# Patient Record
Sex: FEMALE | Race: OTHER | HISPANIC OR LATINO | Employment: FULL TIME | URBAN - METROPOLITAN AREA
[De-identification: names, ages, dates, MRNs, and addresses within clinical notes are randomized per-mention and may not be internally consistent; named-entity substitution may affect disease eponyms.]

---

## 2017-06-04 ENCOUNTER — HOSPITAL ENCOUNTER (EMERGENCY)
Facility: HOSPITAL | Age: 27
Discharge: HOME | End: 2017-06-04
Payer: COMMERCIAL

## 2017-06-04 VITALS
DIASTOLIC BLOOD PRESSURE: 61 MMHG | TEMPERATURE: 97.2 F | SYSTOLIC BLOOD PRESSURE: 135 MMHG | RESPIRATION RATE: 18 BRPM | OXYGEN SATURATION: 99 % | HEART RATE: 87 BPM | WEIGHT: 195 LBS

## 2017-06-04 DIAGNOSIS — L25.3: Primary | ICD-10-CM

## 2017-06-04 PROCEDURE — 99282 EMERGENCY DEPT VISIT SF MDM: CPT

## 2017-06-04 RX ORDER — TRIAMCINOLONE ACETONIDE 0.25 MG/G
1 CREAM TOPICAL 2 TIMES DAILY
Qty: 30 G | Refills: 0 | Status: SHIPPED | OUTPATIENT
Start: 2017-06-04 | End: 2019-02-13

## 2018-10-20 ENCOUNTER — APPOINTMENT (OUTPATIENT)
Dept: URGENT CARE | Facility: MEDICAL CENTER | Age: 28
End: 2018-10-20
Payer: OTHER MISCELLANEOUS

## 2018-10-20 PROCEDURE — G0382 LEV 3 HOSP TYPE B ED VISIT: HCPCS

## 2018-10-20 PROCEDURE — 99283 EMERGENCY DEPT VISIT LOW MDM: CPT

## 2018-10-23 ENCOUNTER — APPOINTMENT (OUTPATIENT)
Dept: URGENT CARE | Facility: MEDICAL CENTER | Age: 28
End: 2018-10-23

## 2018-10-30 ENCOUNTER — APPOINTMENT (OUTPATIENT)
Dept: URGENT CARE | Facility: MEDICAL CENTER | Age: 28
End: 2018-10-30
Payer: OTHER MISCELLANEOUS

## 2018-10-30 PROCEDURE — 99213 OFFICE O/P EST LOW 20 MIN: CPT

## 2019-02-13 ENCOUNTER — HOSPITAL ENCOUNTER (EMERGENCY)
Facility: HOSPITAL | Age: 29
Discharge: HOME | End: 2019-02-13
Attending: EMERGENCY MEDICINE
Payer: COMMERCIAL

## 2019-02-13 VITALS
TEMPERATURE: 98.3 F | OXYGEN SATURATION: 98 % | RESPIRATION RATE: 18 BRPM | SYSTOLIC BLOOD PRESSURE: 129 MMHG | HEART RATE: 88 BPM | WEIGHT: 194.3 LBS | DIASTOLIC BLOOD PRESSURE: 61 MMHG

## 2019-02-13 DIAGNOSIS — M54.30 SCIATICA: Primary | ICD-10-CM

## 2019-02-13 PROCEDURE — 96372 THER/PROPH/DIAG INJ SC/IM: CPT

## 2019-02-13 PROCEDURE — 99283 EMERGENCY DEPT VISIT LOW MDM: CPT

## 2019-02-13 RX ORDER — BACLOFEN 10 MG/1
10 TABLET ORAL 3 TIMES DAILY
Qty: 12 TABLET | Refills: 0 | Status: SHIPPED | OUTPATIENT
Start: 2019-02-13 | End: 2019-06-26

## 2019-02-13 RX ORDER — KETOROLAC TROMETHAMINE 30 MG/ML
15 INJECTION, SOLUTION INTRAMUSCULAR; INTRAVENOUS ONCE
Status: COMPLETED | OUTPATIENT
Start: 2019-02-13 | End: 2019-02-13

## 2019-02-13 RX ORDER — METHYLPREDNISOLONE 4 MG/1
TABLET ORAL
Qty: 21 TABLET | Refills: 0 | Status: SHIPPED | OUTPATIENT
Start: 2019-02-13 | End: 2019-06-26

## 2019-02-13 RX ADMIN — KETOROLAC TROMETHAMINE 15 MG: 30 INJECTION, SOLUTION INTRAMUSCULAR at 12:35

## 2019-02-13 NOTE — ED PROVIDER NOTES
History  Chief Complaint   Patient presents with    Leg Pain     patient reports left hip pain which radiates down leg  Denies injury  Began two hours ago  Tylenol taken without relief  This is a 68-year-old female patient who states she was walking at work approximately 2 hours ago and got pain into her left lower back/buttock and around her thigh with some numbness and paresthesia that does not go past the knee no change in bowel or bladder no saddle anesthesia she walked in under own power  Is made worse when she walks when she stands or when she moves made better when she lies flat on her back  No IV drug use no fever no chills  No headache blurred vision double vision no cough congestion sore throat no nausea vomiting diarrhea abdominal pain no chest pain or shortness of breath no urinary symptoms  She denies any trauma  Does have a history of sciatica  None       History reviewed  No pertinent past medical history  Past Surgical History:   Procedure Laterality Date     SECTION      TONSILLECTOMY         History reviewed  No pertinent family history  I have reviewed and agree with the history as documented  Social History     Tobacco Use    Smoking status: Current Every Day Smoker    Smokeless tobacco: Never Used   Substance Use Topics    Alcohol use: No    Drug use: No        Review of Systems   All other systems reviewed and are negative  Physical Exam  Physical Exam   Constitutional: She appears well-developed and well-nourished  HENT:   Head: Normocephalic and atraumatic  Right Ear: External ear normal    Left Ear: External ear normal    Nose: Nose normal    Mouth/Throat: Oropharynx is clear and moist    Eyes: Pupils are equal, round, and reactive to light  Conjunctivae are normal    Neck: Normal range of motion  Neck supple  Cardiovascular: Normal rate and regular rhythm  Pulmonary/Chest: Effort normal and breath sounds normal    Abdominal: Soft   Bowel sounds are normal  There is no tenderness  Musculoskeletal: Normal range of motion  Back:    Neurological: She is alert  Skin: Skin is warm  Psychiatric: She has a normal mood and affect  Her behavior is normal    Nursing note and vitals reviewed  Vital Signs  ED Triage Vitals [02/13/19 1137]   Temperature Pulse Respirations Blood Pressure SpO2   98 3 °F (36 8 °C) 88 18 129/61 98 %      Temp Source Heart Rate Source Patient Position - Orthostatic VS BP Location FiO2 (%)   Temporal Monitor Sitting Right arm --      Pain Score       7           Vitals:    02/13/19 1137   BP: 129/61   Pulse: 88   Patient Position - Orthostatic VS: Sitting       Visual Acuity      ED Medications  Medications   ketorolac (TORADOL) injection 15 mg (15 mg Intramuscular Given 2/13/19 1235)       Diagnostic Studies  Results Reviewed     None                 No orders to display              Procedures  Procedures       Phone Contacts  ED Phone Contact    ED Course  ED Course as of Feb 13 1306   Wed Feb 13, 2019   1304 Medical decision making:  Patient came in with sciatic type symptoms was given Toradol now has little to no discomfort would like to go home stable for discharge                                  MDM    Disposition  Final diagnoses:   Sciatica     Time reflects when diagnosis was documented in both MDM as applicable and the Disposition within this note     Time User Action Codes Description Comment    2/13/2019  1:04 PM [facility] [place], [place], [facility] [M54 30] Sciatica       ED Disposition     ED Disposition Condition Date/Time Comment    Discharge Stable Wed Feb 13, 2019  1:05 PM Lauren Perdomo discharge to home/self care              Follow-up Information     Follow up With Specialties Details Why [address] Family Medicine Call today  [address] [place] [address] [address] [address] [phone]            Patient's Medications   Discharge Prescriptions    BACLOFEN 10 MG TABLET    Take 1 tablet (10 mg total) by mouth 3 (three) times a day for 4 days       Start Date: 2/13/2019 End Date: 2/17/2019       Order Dose: 10 mg       Quantity: 12 tablet    Refills: 0    METHYLPREDNISOLONE 4 MG TABLET THERAPY PACK    Use as directed on package       Start Date: 2/13/2019 End Date: --       Order Dose: --       Quantity: 21 tablet    Refills: 0     No discharge procedures on file      ED Provider  Electronically Signed by           Leona Sanchez PA-C  02/13/19 4827

## 2019-06-26 ENCOUNTER — HOSPITAL ENCOUNTER (EMERGENCY)
Facility: HOSPITAL | Age: 29
Discharge: HOME | End: 2019-06-26
Attending: EMERGENCY MEDICINE | Admitting: EMERGENCY MEDICINE
Payer: COMMERCIAL

## 2019-06-26 VITALS
OXYGEN SATURATION: 98 % | RESPIRATION RATE: 16 BRPM | SYSTOLIC BLOOD PRESSURE: 134 MMHG | DIASTOLIC BLOOD PRESSURE: 83 MMHG | HEART RATE: 89 BPM | WEIGHT: 196.43 LBS | TEMPERATURE: 98.4 F

## 2019-06-26 DIAGNOSIS — M54.50 LOW BACK PAIN: Primary | ICD-10-CM

## 2019-06-26 PROCEDURE — 96372 THER/PROPH/DIAG INJ SC/IM: CPT

## 2019-06-26 PROCEDURE — 99283 EMERGENCY DEPT VISIT LOW MDM: CPT

## 2019-06-26 PROCEDURE — 99284 EMERGENCY DEPT VISIT MOD MDM: CPT | Performed by: PHYSICIAN ASSISTANT

## 2019-06-26 RX ORDER — KETOROLAC TROMETHAMINE 30 MG/ML
15 INJECTION, SOLUTION INTRAMUSCULAR; INTRAVENOUS ONCE
Status: COMPLETED | OUTPATIENT
Start: 2019-06-26 | End: 2019-06-26

## 2019-06-26 RX ORDER — METHOCARBAMOL 750 MG/1
750 TABLET, FILM COATED ORAL EVERY 6 HOURS PRN
Qty: 20 TABLET | Refills: 0 | Status: SHIPPED | OUTPATIENT
Start: 2019-06-26 | End: 2019-08-19

## 2019-06-26 RX ORDER — ACETAMINOPHEN 325 MG/1
650 TABLET ORAL EVERY 6 HOURS PRN
COMMUNITY
End: 2019-08-19

## 2019-06-26 RX ORDER — NAPROXEN 500 MG/1
500 TABLET ORAL 2 TIMES DAILY WITH MEALS
Qty: 30 TABLET | Refills: 0 | Status: SHIPPED | OUTPATIENT
Start: 2019-06-26 | End: 2019-08-19

## 2019-06-26 RX ADMIN — KETOROLAC TROMETHAMINE 15 MG: 30 INJECTION, SOLUTION INTRAMUSCULAR; INTRAVENOUS at 14:10

## 2019-06-26 NOTE — DISCHARGE INSTRUCTIONS
DISCHARGE INSTRUCTIONS:    FOLLOW UP WITH YOUR PRIMARY CARE PROVIDER OR THE [address] [place] [address]  MAKE AN APPOINTMENT TO BE SEEN  TAKE NAPROXEN AS PRESCRIBED FOR PAIN AND INFLAMMATION  IF RASH, SHORTNESS OF BREATH OR TROUBLE SWALLOWING, STOP TAKING THE MEDICATION AND BE SEEN  TAKE ROBAXIN AS PRESCRIBED FOR MUSCLE STRAIN  IF RASH, SHORTNESS OF BREATH OR TROUBLE SWALLOWING, STOP TAKING THE MEDICATION AND BE SEEN  REST AND APPLY HEAT TO THE AREA  IF SYMPTOMS WORSEN OR NEW SYMPTOMS ARISE, RETURN TO THE ER TO BE SEEN

## 2019-06-26 NOTE — ED PROVIDER NOTES
History  Chief Complaint   Patient presents with    Back Pain     pt c/o left lower back pain with radiation to left leg  pt reports hx of sciatica  reporting similar symptoms  denies radiatioin of pain to abd      29y  o female with PMH of HTN in pregnancy presents to the ER for left hip pain for 3 weeks  Patient has been taking Motrin for pain  She describes her pain as sharp and burning and radiating down her leg  She rates her pain 6/10 and states it comes and goes  She has a history of sciatica and states this feels like her typical pain  Patient states she normally gets a shot and a muscle relaxer and symptoms improve  She denies fever, chills, chest pain, dyspnea, N/V/D, abdominal pain, urinary symptoms, bowel/bladder incontinence, weakness or paresthesias  History provided by:  Patient   used: No        Prior to Admission Medications   Prescriptions Last Dose Informant Patient Reported? Taking?   acetaminophen (TYLENOL) 325 mg tablet   Yes Yes   Sig: Take 650 mg by mouth every 6 (six) hours as needed for mild pain      Facility-Administered Medications: None       Past Medical History:   Diagnosis Date    HTN in pregnancy, chronic        Past Surgical History:   Procedure Laterality Date     SECTION      TONSILLECTOMY         History reviewed  No pertinent family history  I have reviewed and agree with the history as documented  Social History     Tobacco Use    Smoking status: Current Every Day Smoker    Smokeless tobacco: Never Used   Substance Use Topics    Alcohol use: No    Drug use: No        Review of Systems   Constitutional: Negative for chills and fever  Eyes: Negative for photophobia and visual disturbance  Respiratory: Negative for shortness of breath  Cardiovascular: Negative for chest pain  Gastrointestinal: Negative for abdominal pain, diarrhea, nausea and vomiting  Genitourinary: Negative for dysuria, frequency, hematuria and urgency  Musculoskeletal: Positive for back pain  Negative for neck pain and neck stiffness  Skin: Negative for rash  Allergic/Immunologic: Negative for food allergies  Neurological: Negative for weakness, numbness and headaches  Physical Exam  Physical Exam   Constitutional:  Non-toxic appearance  No distress  HENT:   Head: Normocephalic and atraumatic  Right Ear: Tympanic membrane, external ear and ear canal normal  No drainage, swelling or tenderness  No foreign bodies  Tympanic membrane is not erythematous  No hemotympanum  Left Ear: Tympanic membrane, external ear and ear canal normal  No drainage, swelling or tenderness  No foreign bodies  Tympanic membrane is not erythematous  No hemotympanum  Nose: Nose normal    Mouth/Throat: Uvula is midline, oropharynx is clear and moist and mucous membranes are normal  No uvula swelling  No posterior oropharyngeal edema, posterior oropharyngeal erythema or tonsillar abscesses  No tonsillar exudate  Eyes: Pupils are equal, round, and reactive to light  Conjunctivae and EOM are normal    Neck: Normal range of motion and phonation normal  Neck supple  No tracheal deviation present  Cardiovascular: Normal rate, regular rhythm, S1 normal, S2 normal and normal heart sounds  Exam reveals no gallop and no friction rub  No murmur heard  Pulmonary/Chest: Effort normal and breath sounds normal  No respiratory distress  She has no decreased breath sounds  She has no wheezes  She has no rhonchi  She has no rales  She exhibits no tenderness  Abdominal: Soft  Bowel sounds are normal  She exhibits no distension  There is no tenderness  There is no rebound and no guarding  Musculoskeletal: Normal range of motion  She exhibits no edema or deformity  Cervical back: Normal         Thoracic back: Normal         Lumbar back: She exhibits tenderness and pain  She exhibits normal range of motion, no bony tenderness, no swelling, no edema and no deformity  Neurological: She is alert  She has normal strength  No cranial nerve deficit or sensory deficit  She exhibits normal muscle tone  Gait normal  GCS eye subscore is 4  GCS verbal subscore is 5  GCS motor subscore is 6  Skin: Skin is warm and dry  No rash noted  Psychiatric: She has a normal mood and affect  Nursing note and vitals reviewed  Vital Signs  ED Triage Vitals   Temperature Pulse Respirations Blood Pressure SpO2   06/26/19 1310 06/26/19 1320 06/26/19 1320 06/26/19 1320 06/26/19 1320   98 4 °F (36 9 °C) 89 16 134/83 98 %      Temp Source Heart Rate Source Patient Position - Orthostatic VS BP Location FiO2 (%)   06/26/19 1310 -- -- -- --   Temporal          Pain Score       06/26/19 1320       7           Vitals:    06/26/19 1320   BP: 134/83   Pulse: 89         Visual Acuity      ED Medications  Medications   ketorolac (TORADOL) injection 15 mg (15 mg Intramuscular Given 6/26/19 1410)       Diagnostic Studies  Results Reviewed     None                 No orders to display              Procedures  Procedures       ED Course                               MDM  Number of Diagnoses or Management Options  Low back pain: new and does not require workup  Diagnosis management comments: DDX consists of but not limited to: fracture, contusion, strain, sciatica    Will give Toradol  At discharge, I instructed the patient to:  -follow up with pcp  -take Naproxen as prescribed for pain and inflammation  -take Robaxin as prescribed  -rest and apply heat  -return to the ER if symptoms worsened or new symptoms arose  Patient agreed to this plan and was stable at time of discharge      Patient Progress  Patient progress: stable      Disposition  Final diagnoses:   Low back pain     Time reflects when diagnosis was documented in both MDM as applicable and the Disposition within this note     Time User Action Codes Description Comment    6/26/2019  2:05 PM Sharita AMATO Add [M54 5] Low back pain       ED Disposition     ED Disposition Condition Date/Time Comment    Discharge Stable Wed Jun 26, 2019  2:05 PM Lee Jonas discharge to home/self care  Follow-up Information     Follow up With Specialties Details Why Contact Info Additional [address] Family Medicine Schedule an appointment as soon as possible for a visit  As needed [address], [address] [address]  [address], [address], [address] [place], [place], [address]          Discharge Medication List as of 6/26/2019  2:06 PM      START taking these medications    Details   methocarbamol (ROBAXIN) 750 mg tablet Take 1 tablet (750 mg total) by mouth every 6 (six) hours as needed for muscle spasms, Starting Wed 6/26/2019, Print      naproxen (NAPROSYN) 500 mg tablet Take 1 tablet (500 mg total) by mouth 2 (two) times a day with meals, Starting Wed 6/26/2019, Print         CONTINUE these medications which have NOT CHANGED    Details   acetaminophen (TYLENOL) 325 mg tablet Take 650 mg by mouth every 6 (six) hours as needed for mild pain, Historical Med           No discharge procedures on file      ED Provider  Electronically Signed by           Jiame Krishna PA-C  06/26/19 8474

## 2019-08-19 ENCOUNTER — HOSPITAL ENCOUNTER (EMERGENCY)
Facility: HOSPITAL | Age: 29
Discharge: HOME | End: 2019-08-19
Attending: EMERGENCY MEDICINE
Payer: COMMERCIAL

## 2019-08-19 VITALS
OXYGEN SATURATION: 100 % | RESPIRATION RATE: 18 BRPM | HEART RATE: 67 BPM | TEMPERATURE: 99.3 F | WEIGHT: 185.41 LBS | DIASTOLIC BLOOD PRESSURE: 64 MMHG | SYSTOLIC BLOOD PRESSURE: 117 MMHG

## 2019-08-19 DIAGNOSIS — Z32.01 POSITIVE PREGNANCY TEST: ICD-10-CM

## 2019-08-19 DIAGNOSIS — R19.7 DIARRHEA: ICD-10-CM

## 2019-08-19 DIAGNOSIS — R42 LIGHTHEADEDNESS: Primary | ICD-10-CM

## 2019-08-19 DIAGNOSIS — R11.0 NAUSEA: ICD-10-CM

## 2019-08-19 LAB
ANION GAP SERPL CALCULATED.3IONS-SCNC: 9 MMOL/L (ref 4–13)
B-HCG SERPL-ACNC: 183.9 MIU/ML
BILIRUB UR QL STRIP: NEGATIVE
BUN SERPL-MCNC: 16 MG/DL (ref 5–25)
CALCIUM SERPL-MCNC: 8.6 MG/DL (ref 8.3–10.1)
CHLORIDE SERPL-SCNC: 105 MMOL/L (ref 100–108)
CLARITY UR: CLEAR
CLARITY, POC: CLEAR
CO2 SERPL-SCNC: 22 MMOL/L (ref 21–32)
COLOR UR: YELLOW
COLOR, POC: YELLOW
CREAT SERPL-MCNC: 0.53 MG/DL (ref 0.6–1.3)
EXT PREG TEST URINE: ABNORMAL
EXT. CONTROL ED NAV: ABNORMAL
GFR SERPL CREATININE-BSD FRML MDRD: 127 ML/MIN/1.73SQ M
GLUCOSE SERPL-MCNC: 94 MG/DL (ref 65–140)
GLUCOSE UR STRIP-MCNC: NEGATIVE MG/DL
HGB UR QL STRIP.AUTO: NEGATIVE
KETONES UR STRIP-MCNC: NEGATIVE MG/DL
LEUKOCYTE ESTERASE UR QL STRIP: NEGATIVE
MAGNESIUM SERPL-MCNC: 1.8 MG/DL (ref 1.6–2.6)
NITRITE UR QL STRIP: NEGATIVE
PH UR STRIP.AUTO: 5.5 [PH] (ref 4.5–8)
POTASSIUM SERPL-SCNC: 3.9 MMOL/L (ref 3.5–5.3)
PROT UR STRIP-MCNC: NEGATIVE MG/DL
SODIUM SERPL-SCNC: 136 MMOL/L (ref 136–145)
SP GR UR STRIP.AUTO: 1.02 (ref 1–1.03)
UROBILINOGEN UR QL STRIP.AUTO: 0.2 E.U./DL

## 2019-08-19 PROCEDURE — 80048 BASIC METABOLIC PNL TOTAL CA: CPT | Performed by: EMERGENCY MEDICINE

## 2019-08-19 PROCEDURE — 99284 EMERGENCY DEPT VISIT MOD MDM: CPT | Performed by: EMERGENCY MEDICINE

## 2019-08-19 PROCEDURE — 84702 CHORIONIC GONADOTROPIN TEST: CPT | Performed by: EMERGENCY MEDICINE

## 2019-08-19 PROCEDURE — 36415 COLL VENOUS BLD VENIPUNCTURE: CPT | Performed by: EMERGENCY MEDICINE

## 2019-08-19 PROCEDURE — 99284 EMERGENCY DEPT VISIT MOD MDM: CPT

## 2019-08-19 PROCEDURE — 83735 ASSAY OF MAGNESIUM: CPT | Performed by: EMERGENCY MEDICINE

## 2019-08-19 PROCEDURE — 81003 URINALYSIS AUTO W/O SCOPE: CPT

## 2019-08-19 PROCEDURE — 96361 HYDRATE IV INFUSION ADD-ON: CPT

## 2019-08-19 PROCEDURE — 81025 URINE PREGNANCY TEST: CPT | Performed by: EMERGENCY MEDICINE

## 2019-08-19 PROCEDURE — 96374 THER/PROPH/DIAG INJ IV PUSH: CPT

## 2019-08-19 RX ORDER — ACETAMINOPHEN 325 MG/1
650 TABLET ORAL ONCE
Status: COMPLETED | OUTPATIENT
Start: 2019-08-19 | End: 2019-08-19

## 2019-08-19 RX ORDER — METOCLOPRAMIDE HYDROCHLORIDE 5 MG/ML
10 INJECTION INTRAMUSCULAR; INTRAVENOUS ONCE
Status: COMPLETED | OUTPATIENT
Start: 2019-08-19 | End: 2019-08-19

## 2019-08-19 RX ORDER — KETOROLAC TROMETHAMINE 30 MG/ML
15 INJECTION, SOLUTION INTRAMUSCULAR; INTRAVENOUS ONCE
Status: DISCONTINUED | OUTPATIENT
Start: 2019-08-19 | End: 2019-08-19

## 2019-08-19 RX ADMIN — SODIUM CHLORIDE 1000 ML: 0.9 INJECTION, SOLUTION INTRAVENOUS at 15:17

## 2019-08-19 RX ADMIN — METOCLOPRAMIDE 10 MG: 5 INJECTION, SOLUTION INTRAMUSCULAR; INTRAVENOUS at 15:17

## 2019-08-19 RX ADMIN — ACETAMINOPHEN 650 MG: 325 TABLET ORAL at 15:17

## 2019-08-19 NOTE — ED PROVIDER NOTES
History  Chief Complaint   Patient presents with    Dizziness     this morning    Diarrhea     for 4 days with nausea      (1 miscarriage)  Took pregnancy test two times over the weekend which were positive  Having diarrhea x 4 days  Feels lightheaded  Also having low back pain and nausea  Denies abd pain and vaginal bleeding  LMP 19      History provided by:  Patient   used: No    Diarrhea   Quality:  Watery  Duration:  3 days  Progression:  Unchanged  Relieved by:  Nothing  Worsened by:  Nothing  Ineffective treatments:  None tried  Associated symptoms: chills    Associated symptoms: no abdominal pain, no fever and no vomiting    Risk factors: no sick contacts and no travel to endemic areas        None       Past Medical History:   Diagnosis Date    HTN in pregnancy, chronic        Past Surgical History:   Procedure Laterality Date     SECTION      TONSILLECTOMY         History reviewed  No pertinent family history  I have reviewed and agree with the history as documented  Social History     Tobacco Use    Smoking status: Current Every Day Smoker     Packs/day: 0 50    Smokeless tobacco: Never Used   Substance Use Topics    Alcohol use: Yes     Comment: socially    Drug use: No        Review of Systems   Constitutional: Positive for chills  Negative for appetite change and fever  HENT: Negative for congestion and rhinorrhea  Respiratory: Negative for cough  Gastrointestinal: Positive for diarrhea and nausea  Negative for abdominal distention, abdominal pain, blood in stool, constipation and vomiting  Genitourinary: Positive for frequency  Negative for dysuria, flank pain, hematuria, urgency, vaginal bleeding and vaginal discharge  Musculoskeletal: Negative for back pain  All other systems reviewed and are negative  Physical Exam  Physical Exam   Constitutional: She is oriented to person, place, and time   She appears well-developed and well-nourished  No distress  HENT:   Head: Normocephalic  Mouth/Throat: Oropharynx is clear and moist and mucous membranes are normal    Neck: Normal range of motion  Neck supple  Cardiovascular: Normal rate, regular rhythm, normal heart sounds and intact distal pulses  Exam reveals no gallop and no friction rub  No murmur heard  Pulmonary/Chest: Effort normal and breath sounds normal  No respiratory distress  She has no wheezes  She has no rales  Abdominal: Soft  Normal appearance and bowel sounds are normal  She exhibits no distension and no mass  There is no hepatosplenomegaly  There is no tenderness  There is no rigidity, no rebound, no guarding, no CVA tenderness, no tenderness at McBurney's point and negative Emerson's sign  Musculoskeletal:        Thoracic back: She exhibits no tenderness and no bony tenderness  Lumbar back: She exhibits no tenderness and no bony tenderness  Lymphadenopathy:     She has no cervical adenopathy  Neurological: She is alert and oriented to person, place, and time  Skin: Skin is warm, dry and intact  No rash noted  No pallor  Nursing note and vitals reviewed        Vital Signs  ED Triage Vitals [08/19/19 1233]   Temperature Pulse Respirations Blood Pressure SpO2   99 3 °F (37 4 °C) 100 18 129/57 99 %      Temp Source Heart Rate Source Patient Position - Orthostatic VS BP Location FiO2 (%)   Oral Monitor Sitting Right arm --      Pain Score       4           Vitals:    08/19/19 1233 08/19/19 1507   BP: 129/57 117/64   Pulse: 100 67   Patient Position - Orthostatic VS: Sitting Lying         Visual Acuity      ED Medications  Medications   sodium chloride 0 9 % bolus 1,000 mL (0 mL Intravenous Stopped 8/19/19 1621)   metoclopramide (REGLAN) injection 10 mg (10 mg Intravenous Given 8/19/19 1517)   acetaminophen (TYLENOL) tablet 650 mg (650 mg Oral Given 8/19/19 1517)       Diagnostic Studies  Results Reviewed     Procedure Component Value Units Date/Time Quantitative hCG [[id number]]  (Abnormal) Collected:  08/19/19 1446    Lab Status:  Final result Specimen:  Blood from Arm, Left Updated:  08/19/19 1517     HCG, Quant 183 9 mIU/mL     Narrative:        Expected Ranges:     Approximate               Approximate HCG  Gestation age          Concentration ( mIU/mL)  _____________          ______________________   Jenet Reyes                      HCG values  0 2-1                       5-50  1-2                           2-3                         100-5000  3-4                         500-10200  4-5                         1000-55257  5-6                         [id number]  6-8                         [id number]  8-12                        [id number]      Basic metabolic panel [[id number]]  (Abnormal) Collected:  08/19/19 1446    Lab Status:  Final result Specimen:  Blood from Arm, Left Updated:  08/19/19 1503     Sodium 136 mmol/L      Potassium 3 9 mmol/L      Chloride 105 mmol/L      CO2 22 mmol/L      ANION GAP 9 mmol/L      BUN 16 mg/dL      Creatinine 0 53 mg/dL      Glucose 94 mg/dL      Calcium 8 6 mg/dL      eGFR 127 ml/min/1 73sq m     Narrative:       [place] guidelines for Chronic Kidney Disease (CKD):     Stage 1 with normal or high GFR (GFR > 90 mL/min/1 73 square meters)    Stage 2 Mild CKD (GFR = 60-89 mL/min/1 73 square meters)    Stage 3A Moderate CKD (GFR = 45-59 mL/min/1 73 square meters)    Stage 3B Moderate CKD (GFR = 30-44 mL/min/1 73 square meters)    Stage 4 Severe CKD (GFR = 15-29 mL/min/1 73 square meters)    Stage 5 End Stage CKD (GFR <15 mL/min/1 73 square meters)  Note: GFR calculation is accurate only with a steady state creatinine    Magnesium [[id number]]  (Normal) Collected:  08/19/19 1446    Lab Status:  Final result Specimen:  Blood from Arm, Left Updated:  08/19/19 1503     Magnesium 1 8 mg/dL     POCT urinalysis dipstick [[id number]]  (Normal) Resulted:  08/19/19 1420    Lab Status:  Final result Updated:  08/19/19 1420     Color, UA Yellow     Clarity, UA Clear    POCT pregnancy, urine [[id number]]  (Abnormal) Resulted:  08/19/19 1419    Lab Status:  Final result Updated:  08/19/19 1420     EXT PREG TEST UR (Ref: Negative) POSS ( + )     Control Valid    ED Urine Macroscopic [[id number]] Collected:  08/19/19 1417    Lab Status:  Final result Specimen:  Urine Updated:  08/19/19 1418     Color, UA Yellow     Clarity, UA Clear     pH, UA 5 5     Leukocytes, UA Negative     Nitrite, UA Negative     Protein, UA Negative mg/dl      Glucose, UA Negative mg/dl      Ketones, UA Negative mg/dl      Urobilinogen, UA 0 2 E U /dl      Bilirubin, UA Negative     Blood, UA Negative     Specific Gravity, UA 1 025    Narrative:       CLINITEK RESULT                 No orders to display              Procedures  Procedures       ED Course  ED Course as of Aug 19 1631   Mon Aug 19, 2019   1512 Improved from 100   Pulse: 67   1555 Pt updated on results  Feels better  Instructed her to f/u with OBGYN                                   MDM  Number of Diagnoses or Management Options     Amount and/or Complexity of Data Reviewed  Clinical lab tests: reviewed and ordered  Tests in the medicine section of CPT®: ordered and reviewed        Disposition  Final diagnoses:   Lightheadedness   Nausea   Positive pregnancy test   Diarrhea     Time reflects when diagnosis was documented in both MDM as applicable and the Disposition within this note     Time User Action Codes Description Comment    8/19/2019  3:55 PM Danielle Keyes 46 [R42] [address]     8/19/2019  3:55 PM Danielle Keyes 48 [R11 0] Nausea     8/19/2019  3:55 PM Danielle Keyes 48 [Z32 01] Positive pregnancy test     8/19/2019  3:55 PM Danielle Keyes 48 [R19 7] Diarrhea       ED Disposition     ED Disposition Condition Date/Time Comment    Discharge Stable Mon Aug 19, 2019  3:56 PM Shellie Prieto discharge to home/self care              Follow-up Information Follow up With Specialties Details Why Contact Info    Your OBGYN  Schedule an appointment as soon as possible for a visit  For your pregnancy           There are no discharge medications for this patient  No discharge procedures on file      ED Provider  Electronically Signed by           Edin Harding 22, DO 08/19/19 4244